# Patient Record
Sex: MALE | Race: WHITE
[De-identification: names, ages, dates, MRNs, and addresses within clinical notes are randomized per-mention and may not be internally consistent; named-entity substitution may affect disease eponyms.]

---

## 2016-11-12 VITALS
SYSTOLIC BLOOD PRESSURE: 117 MMHG | SYSTOLIC BLOOD PRESSURE: 117 MMHG | DIASTOLIC BLOOD PRESSURE: 80 MMHG | DIASTOLIC BLOOD PRESSURE: 80 MMHG | DIASTOLIC BLOOD PRESSURE: 80 MMHG | SYSTOLIC BLOOD PRESSURE: 117 MMHG

## 2018-01-17 ENCOUNTER — HOSPITAL ENCOUNTER (OUTPATIENT)
Dept: HOSPITAL 63 - PMG | Age: 23
Discharge: HOME | End: 2018-01-17
Attending: PHYSICIAN ASSISTANT
Payer: COMMERCIAL

## 2018-01-17 DIAGNOSIS — R07.81: Primary | ICD-10-CM

## 2018-01-17 PROCEDURE — 71111 X-RAY EXAM RIBS/CHEST4/> VWS: CPT

## 2018-01-17 NOTE — RAD
Bilateral ribs and single view chest 1/17/2018.



Clinical indication: Bilateral rib pain, greater on the left.



Comparison: None.



Findings: Cardiac and mediastinal silhouettes are unremarkable.  No pleural

effusion, pneumothorax or focal consolidation.



No evidence of acute displaced rib fracture deformity.



Impression:

1.  No acute cardiopulmonary abnormality.

2.  No evidence of acute displaced rib fracture deformity.

## 2018-02-27 ENCOUNTER — HOSPITAL ENCOUNTER (OUTPATIENT)
Dept: HOSPITAL 63 - PMG | Age: 23
Discharge: HOME | End: 2018-02-27
Attending: PHYSICIAN ASSISTANT
Payer: COMMERCIAL
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
## 2018-02-27 DIAGNOSIS — S62.394A: Primary | ICD-10-CM

## 2018-02-27 PROCEDURE — 73120 X-RAY EXAM OF HAND: CPT

## 2018-02-27 NOTE — RAD
2 views right hand  2/27/2018 2:00 AM



Indication: RIGHT HAND PAIN, injury today



Comparison: None



Findings: There is a nondisplaced, obliquely oriented fracture through the

mid, fourth metacarpal.  Evaluation of the base of the metacarpal is limited

by overlying structures.  No other definitive fractures seen.  No dislocation

is identified.



Impression: Fracture of the mid fourth metacarpal as described.

## 2018-04-11 ENCOUNTER — HOSPITAL ENCOUNTER (OUTPATIENT)
Dept: HOSPITAL 63 - PMG | Age: 23
Discharge: HOME | End: 2018-04-11
Attending: PHYSICIAN ASSISTANT
Payer: COMMERCIAL
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
## 2018-04-11 DIAGNOSIS — S62.344G: Primary | ICD-10-CM

## 2018-04-11 PROCEDURE — 73120 X-RAY EXAM OF HAND: CPT

## 2018-04-11 NOTE — RAD
Right hand, 2 views, 4/11/2018:



History: Follow-up fracture



Comparison is made to a study from 3/20/2018.  Images were obtained through a

radiopaque cast partially compromising bony detail.  The nondisplaced fracture

of the fourth metacarpal shaft is unchanged in position.  The fracture line is

still visible.  A small amount of radiopaque debris is again noted projected

over the cast.



IMPRESSION: Stable fourth metacarpal fracture.